# Patient Record
Sex: MALE | Race: WHITE | NOT HISPANIC OR LATINO | Employment: UNEMPLOYED | URBAN - METROPOLITAN AREA
[De-identification: names, ages, dates, MRNs, and addresses within clinical notes are randomized per-mention and may not be internally consistent; named-entity substitution may affect disease eponyms.]

---

## 2018-08-26 ENCOUNTER — HOSPITAL ENCOUNTER (EMERGENCY)
Facility: HOSPITAL | Age: 33
Discharge: HOME | End: 2018-08-26
Attending: EMERGENCY MEDICINE | Admitting: EMERGENCY MEDICINE
Payer: COMMERCIAL

## 2018-08-26 VITALS
TEMPERATURE: 98.5 F | HEART RATE: 87 BPM | OXYGEN SATURATION: 98 % | WEIGHT: 208 LBS | HEIGHT: 71 IN | SYSTOLIC BLOOD PRESSURE: 164 MMHG | DIASTOLIC BLOOD PRESSURE: 99 MMHG | BODY MASS INDEX: 29.12 KG/M2 | RESPIRATION RATE: 18 BRPM

## 2018-08-26 DIAGNOSIS — S61.209A FINGER AVULSION, INITIAL ENCOUNTER: Primary | ICD-10-CM

## 2018-08-26 PROCEDURE — 90715 TDAP VACCINE 7 YRS/> IM: CPT | Performed by: EMERGENCY MEDICINE

## 2018-08-26 PROCEDURE — 90471 IMMUNIZATION ADMIN: CPT

## 2018-08-26 PROCEDURE — 99282 EMERGENCY DEPT VISIT SF MDM: CPT

## 2018-08-26 RX ADMIN — TETANUS TOXOID, REDUCED DIPHTHERIA TOXOID AND ACELLULAR PERTUSSIS VACCINE, ADSORBED 0.5 ML: 5; 2.5; 8; 8; 2.5 SUSPENSION INTRAMUSCULAR at 21:39

## 2018-08-27 NOTE — DISCHARGE INSTRUCTIONS
Take OTC Motrin/Tylenol as directed  Keep wound clean  Follow up with PCP within 5-7 days for reevaluation of symptoms  Wound Care Instructions: Clean wound thoroughly with soap/water, dry and then apply small strip of surgifoam (~ same size as wound) and apply dressing/pressure  Once bleeding is controlled and surgifoam is no longer needed, clean wound and apply antibiotic ointment with dressing daily  Return to ED if new or worsening symptoms for immediate reevaluation  Skin Avulsion   WHAT YOU NEED TO KNOW:   Skin avulsion is a wound that happens when skin is torn from your body during an accident or other injury  The torn skin may be lost or too damaged to be repaired, and it must be removed  A wound of this type cannot be stitched closed because there is tissue missing  Avulsion wounds are usually bigger and have more scars because of the missing tissue  DISCHARGE INSTRUCTIONS:   Medicines:   · Antibiotic ointment:  Your healthcare provider may tell you to gently rub a topical antibiotic ointment on your wound  This will help prevent an infection and help your wound heal faster  · Pain medicine: You may be given medicine to take away or decrease pain  Do not wait until the pain is severe before you take your medicine  · NSAIDs , such as ibuprofen, help decrease swelling, pain, and fever  This medicine is available with or without a doctor's order  NSAIDs can cause stomach bleeding or kidney problems in certain people  If you take blood thinner medicine, always ask if NSAIDs are safe for you  Always read the medicine label and follow directions  Do not give these medicines to children under 10months of age without direction from your child's healthcare provider  · Take your medicine as directed  Contact your healthcare provider if you think your medicine is not helping or if you have side effects  Tell him of her if you are allergic to any medicine   Keep a list of the medicines, vitamins, and herbs you take  Include the amounts, and when and why you take them  Bring the list or the pill bottles to follow-up visits  Carry your medicine list with you in case of an emergency  Care for your wound:  Avulsion wounds may take longer to heal because they cannot be closed with tape or stitches  Keep your wound clean and protected to prevent infection and speed healing  · Clean your wound:  Wash your hands with soap and water before and after you care for your wound  You may be able to use a soft cloth to gently clean the wound after the first 24 to 48 hours  After that, gently clean the wound once or twice a day with cool water  Do not soak your wound  Use soap to clean around the wound, but try not to get any on the wound itself  Do not use alcohol or hydrogen peroxide to clean your wound unless you are directed to  Gently pat the area dry and reapply the bandage as directed  · Elevate your wound:  Prop your injured area on pillows to raise it above the level of your heart  This will help reduce pain and swelling  Do this for 30 minutes at a time, as often as you can  · Bandage your wound:  Bandages keep your wound clean, dry, and protected from infection  They may also prevent swelling  Use a bandage that does not stick to your wound, and has a spongy layer to absorb fluids  Leave your bandage on as long as directed  Ask your healthcare provider when and how to change your bandage  Do not wrap the bandage too tightly  This could cut off blood flow and cause more injury  · Use cool compresses:  Wet a washcloth or towel with cool water and hold it on your wound as directed  Ask how often to apply the compress and for how long each time  · Reduce scarring:  Avoid direct sunlight on your wound  Sunlight may burn or change the color of the new skin over your wound  Use sunscreen (SPF 30 or higher) on the new skin for at least 1 year after it heals  Support for leg and arm wounds:   You may need to use crutches if the wound is on your leg  You may need to use a sling if the wound is on your arm  Crutches and slings help protect the injured area, prevent further injury, and heal the area in the right position  Follow up with your healthcare provider within 2 days or as directed: If you have stitches, ask when to return to have them removed  Write down your questions so you remember to ask them during your visits  Contact your healthcare provider if:   · You have new pain, or it gets worse  · You have trouble moving the injured body area  · Your wound splits open or does not seem to be healing  Return to the emergency department if:   · You have a fever  · You have painful swelling, redness, or warmth around your wound  · Your wound is red and there are red streaks on your skin starting at your wound and moving upward  · Your wound is draining pus  · You have heavy bleeding or bleeding that does not stop after 10 minutes of holding firm, direct pressure over the wound  · You feel like there is an object stuck in your wound  © 2017 [employer]  Information is for End User's use only and may not be sold, redistributed or otherwise used for commercial purposes  All illustrations and images included in CareNotes® are the copyrighted property of [employer]  or [employer]  The above information is an  only  It is not intended as medical advice for individual conditions or treatments  Talk to your doctor, nurse or pharmacist before following any medical regimen to see if it is safe and effective for you

## 2018-08-27 NOTE — ED ATTENDING ATTESTATION
Diego Darden MD, saw and evaluated the patient  I have discussed the patient with the resident/non-physician practitioner and agree with the resident's/non-physician practitioner's findings, Plan of Care, and MDM as documented in the resident's/non-physician practitioner's note, except where noted  All available labs and Radiology studies were reviewed  At this point I agree with the current assessment done in the Emergency Department  I have conducted an independent evaluation of this patient a history and physical is as follows:      Critical Care Time  CritCare Time    Procedures     29 yo male with right thumb laceration, avulsion with persistent bleeding  Pt used   pmh htn  Vss, afebrile, lungs cta, rrr  Avulsion of right thumb  Surgicell, pressure  tetanus

## 2018-08-27 NOTE — ED PROVIDER NOTES
History  Chief Complaint   Patient presents with    Laceration     right thumb laceration, denied blood thinners     79-year-old male presents emergency department for evaluation of right thumb injury that occurred just prior to arrival   Patient states he was using a that she slicer when he accidentally cut the distal aspect of his right thumb  Patient with approximately 1 cm superficial avulsion 2 radial aspect of right distal thumb with a small amount of distal nail involvement  Patient states despite applying pressure to area prior to arrival wound has continued to bleed  Patient denies any additional trauma/injury, maintains full range of motion of affected digit, distal neurovascularly intact, no subungual hematoma or significant nail bed involvement  Patient has past medical history of hypertension, no significant past surgical history  Patient is a current smoker, denies any EtOH or recreational drug use  Patient is unsure of last tetanus vaccination  Patient denies any additional medical complaints at time of examination  No other interventions prior to arrival, no other complaints at this time  None       Past Medical History:   Diagnosis Date    Hypertension        History reviewed  No pertinent surgical history  History reviewed  No pertinent family history  I have reviewed and agree with the history as documented  Social History   Substance Use Topics    Smoking status: Current Some Day Smoker    Smokeless tobacco: Never Used    Alcohol use No        Review of Systems   Constitutional: Negative for chills, fatigue and fever  HENT: Negative for congestion, rhinorrhea and sore throat  Eyes: Negative for pain, redness and visual disturbance  Respiratory: Negative for cough, chest tightness, shortness of breath, wheezing and stridor  Cardiovascular: Negative for chest pain, palpitations and leg swelling     Gastrointestinal: Negative for abdominal pain, blood in stool, constipation, diarrhea, nausea and vomiting  Genitourinary: Negative for dysuria, frequency, hematuria and urgency  Musculoskeletal: Negative for back pain and neck pain  Skin: Positive for wound (R distal thumb avulsion injury)  Negative for pallor and rash  Neurological: Negative for dizziness, seizures, syncope, weakness, light-headedness and headaches  Psychiatric/Behavioral: Negative for agitation and confusion  Physical Exam  ED Triage Vitals   Temperature Pulse Respirations Blood Pressure SpO2   08/26/18 2037 08/26/18 2037 08/26/18 2037 08/26/18 2037 08/26/18 2037   98 5 °F (36 9 °C) 87 18 164/99 98 %      Temp Source Heart Rate Source Patient Position - Orthostatic VS BP Location FiO2 (%)   08/26/18 2037 08/26/18 2037 -- 08/26/18 2037 --   Oral Monitor  Left arm       Pain Score       08/26/18 2035       6           Orthostatic Vital Signs  Vitals:    08/26/18 2037   BP: 164/99   Pulse: 87       Physical Exam   Constitutional: He is oriented to person, place, and time  He appears well-developed and well-nourished  No distress  HENT:   Head: Normocephalic and atraumatic  Nose: Nose normal    Mouth/Throat: Oropharynx is clear and moist  No oropharyngeal exudate  Eyes: Conjunctivae and EOM are normal  Pupils are equal, round, and reactive to light  Right eye exhibits no discharge  Left eye exhibits no discharge  Neck: Normal range of motion  Neck supple  No JVD present  No tracheal deviation present  Cardiovascular: Normal rate and intact distal pulses  Exam reveals no gallop and no friction rub  No murmur heard  Pulmonary/Chest: Effort normal and breath sounds normal  No stridor  No respiratory distress  He has no wheezes  He has no rales  He exhibits no tenderness  Abdominal: Soft  Bowel sounds are normal  He exhibits no distension  There is no tenderness  There is no rebound and no guarding  Musculoskeletal: Normal range of motion  He exhibits tenderness   He exhibits no edema or deformity  ~ 1 cm superficial avulsion injury to distal aspect of right radial thumb with active moderate bleeding, minor distal lateral nail involvement, no significant nail involvement and no subungual hematoma, neurovascularly intact with intact ROM of affected extremity, no osseous involvement   Neurological: He is alert and oriented to person, place, and time  No cranial nerve deficit  Skin: Skin is warm  Capillary refill takes less than 2 seconds  No rash noted  He is not diaphoretic  See MSK   Psychiatric: He has a normal mood and affect  Nursing note and vitals reviewed  ED Medications  Medications   tetanus-diphtheria-acellular pertussis (BOOSTRIX) IM injection 0 5 mL (0 5 mL Intramuscular Given 8/26/18 2139)       Diagnostic Studies  Results Reviewed     None                 No orders to display         Procedures  Procedures      Phone Consults  ED Phone Contact    ED Course                               MDM  Number of Diagnoses or Management Options  Diagnosis management comments: 70-year-old male presents with superficial avulsion injury of distal radial aspect of right thumb with mild distal nail involvement, neurovascularly intact, no significant nail bed involvement  We will update tetanus, wound care with tourniquet application, surgifoam application and dressing  CritCare Time      Wound cleaned, finger tourniquet applied and surgifoam placed and dressing applied  Patient reevaluated with improvement in condition noted  Patient updated on plan of care  Discharge instructions given including medications, follow-up and return precautions with understanding verbalized             Disposition  Final diagnoses:   Finger avulsion, initial encounter     Time reflects when diagnosis was documented in both MDM as applicable and the Disposition within this note     Time User Action Codes Description Comment    8/26/2018 10:21 PM Elisabeth Weiss Child Add [[id number]] Finger avulsion, initial encounter       ED Disposition     ED Disposition Condition Comment    Discharge  Nilda Paz discharge to home/self care  Condition at discharge: Stable        Follow-up Information     Follow up With Specialties Details Why [address], DO Family Medicine  within 5-7 days for reevaluation of wound [address] [id number]            There are no discharge medications for this patient  No discharge procedures on file  ED Provider  Attending physically available and evaluated Nilda Paz I managed the patient along with the ED Attending      Electronically Signed by         Leticia Camarillo DO  08/28/18 7305

## 2025-05-02 ENCOUNTER — OFFICE VISIT (OUTPATIENT)
Dept: FAMILY MEDICINE CLINIC | Facility: CLINIC | Age: 40
End: 2025-05-02
Payer: COMMERCIAL

## 2025-05-02 ENCOUNTER — APPOINTMENT (OUTPATIENT)
Dept: LAB | Facility: CLINIC | Age: 40
End: 2025-05-02
Payer: COMMERCIAL

## 2025-05-02 VITALS
OXYGEN SATURATION: 98 % | RESPIRATION RATE: 16 BRPM | HEART RATE: 92 BPM | WEIGHT: 236 LBS | TEMPERATURE: 98.2 F | BODY MASS INDEX: 33.79 KG/M2 | HEIGHT: 70 IN | DIASTOLIC BLOOD PRESSURE: 100 MMHG | SYSTOLIC BLOOD PRESSURE: 150 MMHG

## 2025-05-02 DIAGNOSIS — Z13.220 LIPID SCREENING: ICD-10-CM

## 2025-05-02 DIAGNOSIS — I10 ESSENTIAL HYPERTENSION: ICD-10-CM

## 2025-05-02 DIAGNOSIS — G47.33 OSA (OBSTRUCTIVE SLEEP APNEA): ICD-10-CM

## 2025-05-02 DIAGNOSIS — Z00.00 PHYSICAL EXAM, ANNUAL: Primary | ICD-10-CM

## 2025-05-02 LAB
ALBUMIN SERPL BCG-MCNC: 4.9 G/DL (ref 3.5–5)
ALP SERPL-CCNC: 63 U/L (ref 34–104)
ALT SERPL W P-5'-P-CCNC: 35 U/L (ref 7–52)
ANION GAP SERPL CALCULATED.3IONS-SCNC: 11 MMOL/L (ref 4–13)
AST SERPL W P-5'-P-CCNC: 30 U/L (ref 13–39)
BASOPHILS # BLD AUTO: 0.06 THOUSANDS/ÂΜL (ref 0–0.1)
BASOPHILS NFR BLD AUTO: 1 % (ref 0–1)
BILIRUB SERPL-MCNC: 0.45 MG/DL (ref 0.2–1)
BUN SERPL-MCNC: 12 MG/DL (ref 5–25)
CALCIUM SERPL-MCNC: 9.7 MG/DL (ref 8.4–10.2)
CHLORIDE SERPL-SCNC: 103 MMOL/L (ref 96–108)
CHOLEST SERPL-MCNC: 229 MG/DL (ref ?–200)
CO2 SERPL-SCNC: 25 MMOL/L (ref 21–32)
CREAT SERPL-MCNC: 1.02 MG/DL (ref 0.6–1.3)
EOSINOPHIL # BLD AUTO: 0.02 THOUSAND/ÂΜL (ref 0–0.61)
EOSINOPHIL NFR BLD AUTO: 0 % (ref 0–6)
ERYTHROCYTE [DISTWIDTH] IN BLOOD BY AUTOMATED COUNT: 12.5 % (ref 11.6–15.1)
GFR SERPL CREATININE-BSD FRML MDRD: 90 ML/MIN/1.73SQ M
GLUCOSE P FAST SERPL-MCNC: 143 MG/DL (ref 65–99)
HCT VFR BLD AUTO: 46 % (ref 36.5–49.3)
HDLC SERPL-MCNC: 41 MG/DL
HGB BLD-MCNC: 15.5 G/DL (ref 12–17)
IMM GRANULOCYTES # BLD AUTO: 0.02 THOUSAND/UL (ref 0–0.2)
IMM GRANULOCYTES NFR BLD AUTO: 0 % (ref 0–2)
LDLC SERPL CALC-MCNC: 157 MG/DL (ref 0–100)
LYMPHOCYTES # BLD AUTO: 0.92 THOUSANDS/ÂΜL (ref 0.6–4.47)
LYMPHOCYTES NFR BLD AUTO: 15 % (ref 14–44)
MCH RBC QN AUTO: 30.7 PG (ref 26.8–34.3)
MCHC RBC AUTO-ENTMCNC: 33.7 G/DL (ref 31.4–37.4)
MCV RBC AUTO: 91 FL (ref 82–98)
MONOCYTES # BLD AUTO: 0.43 THOUSAND/ÂΜL (ref 0.17–1.22)
MONOCYTES NFR BLD AUTO: 7 % (ref 4–12)
NEUTROPHILS # BLD AUTO: 4.88 THOUSANDS/ÂΜL (ref 1.85–7.62)
NEUTS SEG NFR BLD AUTO: 77 % (ref 43–75)
NRBC BLD AUTO-RTO: 0 /100 WBCS
PLATELET # BLD AUTO: 202 THOUSANDS/UL (ref 149–390)
PMV BLD AUTO: 10.2 FL (ref 8.9–12.7)
POTASSIUM SERPL-SCNC: 4.4 MMOL/L (ref 3.5–5.3)
PROT SERPL-MCNC: 7.3 G/DL (ref 6.4–8.4)
RBC # BLD AUTO: 5.05 MILLION/UL (ref 3.88–5.62)
SODIUM SERPL-SCNC: 139 MMOL/L (ref 135–147)
TRIGL SERPL-MCNC: 153 MG/DL (ref ?–150)
TSH SERPL DL<=0.05 MIU/L-ACNC: 0.99 UIU/ML (ref 0.45–4.5)
WBC # BLD AUTO: 6.33 THOUSAND/UL (ref 4.31–10.16)

## 2025-05-02 PROCEDURE — 99203 OFFICE O/P NEW LOW 30 MIN: CPT | Performed by: NURSE PRACTITIONER

## 2025-05-02 PROCEDURE — 36415 COLL VENOUS BLD VENIPUNCTURE: CPT

## 2025-05-02 PROCEDURE — 85025 COMPLETE CBC W/AUTO DIFF WBC: CPT

## 2025-05-02 PROCEDURE — 84443 ASSAY THYROID STIM HORMONE: CPT

## 2025-05-02 PROCEDURE — 80053 COMPREHEN METABOLIC PANEL: CPT

## 2025-05-02 PROCEDURE — 99396 PREV VISIT EST AGE 40-64: CPT | Performed by: NURSE PRACTITIONER

## 2025-05-02 PROCEDURE — 80061 LIPID PANEL: CPT

## 2025-05-02 RX ORDER — LISINOPRIL 20 MG/1
20 TABLET ORAL DAILY
Qty: 100 TABLET | Refills: 3 | Status: SHIPPED | OUTPATIENT
Start: 2025-05-02

## 2025-05-02 NOTE — ASSESSMENT & PLAN NOTE
BP elevated in office today.   History of hypertension, previously took lisinopril 20 mg daily.   He has made lifestyle changes over the last several years, and lost weight.   He is reluctant to start antihypertensive medication, but will if needed.   We agree he will obtain home blood pressure cuff. He will start checking home blood pressures, sitting 5-10 minutes prior to checking BP. If BP is consistently >140/90, he will start taking lisinopril 20 mg daily.   He is strongly advised to follow up on history of BHAVYA.   We reviewed risks of long term uncontrolled hypertension.     Orders:  •  CBC and differential; Future  •  Comprehensive metabolic panel; Future  •  TSH, 3rd generation; Future  •  lisinopril (ZESTRIL) 20 mg tablet; Take 1 tablet (20 mg total) by mouth daily

## 2025-05-02 NOTE — PROGRESS NOTES
Adult Annual Physical  Name: Darian Carver      : 1985      MRN: [id number]  Encounter Provider: CATARINA Eldridge  Encounter Date: 2025   Encounter department: [facility]    :  Assessment & Plan  Physical exam, annual         Essential hypertension  BP elevated in office today.   History of hypertension, previously took lisinopril 20 mg daily.   He has made lifestyle changes over the last several years, and lost weight.   He is reluctant to start antihypertensive medication, but will if needed.   We agree he will obtain home blood pressure cuff. He will start checking home blood pressures, sitting 5-10 minutes prior to checking BP. If BP is consistently >140/90, he will start taking lisinopril 20 mg daily.   He is strongly advised to follow up on history of BHAVYA.   We reviewed risks of long term uncontrolled hypertension.     Orders:  •  CBC and differential; Future  •  Comprehensive metabolic panel; Future  •  TSH, 3rd generation; Future  •  lisinopril (ZESTRIL) 20 mg tablet; Take 1 tablet (20 mg total) by mouth daily    BHAVYA (obstructive sleep apnea)  History of severe obstructive sleep apnea on sleep study 10/20/2016-available in EMR.   Strongly advised on importance of treating BHAVYA, and risk associated with untreated BHAVYA.   Sleep medicine contact information provided.       Orders:  •  Ambulatory Referral to Sleep Medicine; Future    Lipid screening    Orders:  •  Lipid Panel with Direct LDL reflex; Future      Complete blood work at this time.   Follow up in 6 months.     Preventive Screenings:  - Diabetes Screening: orders placed  - Cholesterol Screening: orders placed   - Colon cancer screening: screening not indicated   - Lung cancer screening: screening not indicated   - Prostate cancer screening: screening not indicated     Counseling/Anticipatory Guidance:    - Dental health: discussed importance of regular tooth brushing, flossing, and dental visits.   - Diet: discussed  recommendations for a healthy/well-balanced diet.   - Exercise: the importance of regular exercise/physical activity was discussed. Recommend exercise 3-5 times per week for at least 30 minutes.          History of Present Illness     Adult Annual Physical:  Patient presents for annual physical. Darian Carver is a 40 year old male presenting today for annual exam and to establish care at our office.     Has not been seen by PCP for many years now.     Always nervous for medical appointments, fears there will be bad news.   Nervous to be here today.     Has known history of hypertension and BHAVYA.     Sleep study in EMR from 10/20/2016 with severe BHAVYA.   He did not pursue CPAP. States it was not tolerable.   Previously on lisinopril 20 mg daily, stopped taking it.     Has been trying to make healthy lifestyle changes.     Working out--mostly weights, push ups 250-500 per day  Cold plunges  Supplements  Intermittent fasting     Weight max 260's.  Now 236--was trying to bulk up--feels needs to back up now.  Feels the best when his weight is between  217 and 225.    Eating healthy foods.   Veggies, proteins.  Limits carbs.    As per his wife:  snores when lying supine sleeping.   Does better prone.     Checks blood pressure at store periodically.   BP running 130/100.    Chiropractor three times per week for bad back, ,HX stenosis, herniated disc, low back pain.        .     Diet and Physical Activity:  - Diet/Nutrition: well balanced diet, consuming 3-5 servings of fruits/vegetables daily, intermittent fasting and low carb diet.  - Exercise: strength training exercises.    Depression Screening:  - PHQ-2 Score: 0    General Health:  - Sleep:. BHAVYA  - Hearing: normal hearing bilateral ears.  - Vision: no vision problems.  - Dental: regular dental visits.    Review of Systems   Constitutional: Negative.    HENT: Negative.     Respiratory: Negative.     Cardiovascular: Negative.    Gastrointestinal: Negative.   "  Genitourinary: Negative.    Musculoskeletal: Negative.    Skin: Negative.    Neurological: Negative.    Hematological: Negative.    Psychiatric/Behavioral: Negative.           Objective   /100 (BP Location: Left arm)   Pulse 92   Temp 98.2 °F (36.8 °C) (Temporal)   Resp 16   Ht 5' 10.08\" (1.78 m)   Wt 107 kg (236 lb)   SpO2 98%   BMI 33.79 kg/m²     Physical Exam  Vitals and nursing note reviewed.   Constitutional:       Appearance: Normal appearance. He is not ill-appearing.   HENT:      Head: Atraumatic.      Right Ear: Tympanic membrane normal.      Left Ear: Tympanic membrane normal.      Mouth/Throat:      Mouth: Mucous membranes are moist.      Pharynx: Oropharynx is clear.   Eyes:      Conjunctiva/sclera: Conjunctivae normal.      Pupils: Pupils are equal, round, and reactive to light.   Cardiovascular:      Rate and Rhythm: Normal rate and regular rhythm.      Heart sounds: No murmur heard.  Pulmonary:      Effort: Pulmonary effort is normal. No respiratory distress.      Breath sounds: Normal breath sounds. No wheezing or rales.   Abdominal:      General: Bowel sounds are normal.      Palpations: Abdomen is soft.      Tenderness: There is no abdominal tenderness.   Musculoskeletal:         General: No deformity.      Cervical back: Normal range of motion and neck supple.      Right lower leg: No edema.      Left lower leg: No edema.   Lymphadenopathy:      Cervical: No cervical adenopathy.   Skin:     General: Skin is warm and dry.      Findings: No rash.   Neurological:      Mental Status: He is alert.      Gait: Gait normal.   Psychiatric:         Mood and Affect: Mood normal.           Current Outpatient Medications:   •  lisinopril (ZESTRIL) 20 mg tablet, Take 1 tablet (20 mg total) by mouth daily, Disp: 100 tablet, Rfl: 3   "

## 2025-05-04 ENCOUNTER — RESULTS FOLLOW-UP (OUTPATIENT)
Dept: FAMILY MEDICINE CLINIC | Facility: CLINIC | Age: 40
End: 2025-05-04

## 2025-05-04 DIAGNOSIS — E78.5 HYPERLIPIDEMIA, UNSPECIFIED HYPERLIPIDEMIA TYPE: Primary | ICD-10-CM

## 2025-05-04 DIAGNOSIS — R73.01 IMPAIRED FASTING GLUCOSE: ICD-10-CM

## 2025-05-04 DIAGNOSIS — I10 ESSENTIAL HYPERTENSION: ICD-10-CM

## 2025-05-05 ENCOUNTER — TRANSCRIBE ORDERS (OUTPATIENT)
Dept: SLEEP CENTER | Facility: CLINIC | Age: 40
End: 2025-05-05

## 2025-05-05 DIAGNOSIS — G47.33 OSA (OBSTRUCTIVE SLEEP APNEA): Primary | ICD-10-CM

## 2025-05-05 NOTE — TELEPHONE ENCOUNTER
Patient called back, I read the message regarding his blood work results in its entirety, he verbalized understanding and has no further questions

## 2025-05-07 DIAGNOSIS — I10 ESSENTIAL HYPERTENSION: Primary | ICD-10-CM

## 2025-05-08 ENCOUNTER — PATIENT OUTREACH (OUTPATIENT)
Dept: CASE MANAGEMENT | Facility: OTHER | Age: 40
End: 2025-05-08

## 2025-05-08 NOTE — PROGRESS NOTES
Outpatient Care Management Note:  Referral for care management received due to HTN. Chart review completed.     History includes HTN and BHAVYA.    5/2/2025-PCP visit to establish care.  Had been on lisinopril in the past but no longer taking and reluctant to start.  Not using CPAP for BHAVYA.  Encouraged to monitor BP at home and follow up with sleep medicine.     Outreach call attempted to Mr. Carver.  Message left for patient to please return call.  Contact information left on message.     Call reminder sent.

## 2025-05-13 ENCOUNTER — PATIENT OUTREACH (OUTPATIENT)
Dept: CASE MANAGEMENT | Facility: OTHER | Age: 40
End: 2025-05-13

## 2025-05-13 NOTE — PROGRESS NOTES
"Outpatient Care Management Note:  Outreach call placed to Mr. Carver.  Introduced myself and my call.     Mr. Carver is not interested in ongoing outreach at this time.  He did obtain a BP cuff and is checking his BP daily.  His readings have been 135-140/80-90.  He feels his BP was more elevated in the office due to seeing a provider for the first time in a \"long time.\"  Supportive listening provided.     Discussed home sleep study that has been ordered.  He has not scheduled it at this time.  He shares that he has tried using CPAP in the past and was unable to tolerate it.  He did have a full face mask at that time.  Discussed that newer options such as nasal pillows are available.  If that is an option for him discussed that it may be better tolerated.     Darian feels that he follows a fairly healthy diet and utilizes intermittent fasting.  His PCP did recommend trailing a Mediterranean diet.  Discussed that whatever diet he is using to limit his sodium intake as this can also affect his BP.  Verbalized understanding.     Advised Darian that should he change his mind about outreach, his PCP can place a new referral for same. Darian was appreciative of call and information.   "